# Patient Record
Sex: MALE | Race: WHITE | Employment: FULL TIME | URBAN - METROPOLITAN AREA
[De-identification: names, ages, dates, MRNs, and addresses within clinical notes are randomized per-mention and may not be internally consistent; named-entity substitution may affect disease eponyms.]

---

## 2017-02-21 ENCOUNTER — APPOINTMENT (OUTPATIENT)
Dept: CT IMAGING | Age: 52
End: 2017-02-21
Attending: PHYSICIAN ASSISTANT
Payer: COMMERCIAL

## 2017-02-21 ENCOUNTER — HOSPITAL ENCOUNTER (EMERGENCY)
Age: 52
Discharge: HOME | End: 2017-02-21
Attending: EMERGENCY MEDICINE
Payer: COMMERCIAL

## 2017-02-21 VITALS
TEMPERATURE: 98.6 F | RESPIRATION RATE: 18 BRPM | SYSTOLIC BLOOD PRESSURE: 141 MMHG | WEIGHT: 195 LBS | DIASTOLIC BLOOD PRESSURE: 89 MMHG | BODY MASS INDEX: 27.92 KG/M2 | OXYGEN SATURATION: 97 % | HEART RATE: 90 BPM | HEIGHT: 70 IN

## 2017-02-21 DIAGNOSIS — K52.9 ENTEROCOLITIS: Primary | ICD-10-CM

## 2017-02-21 LAB
APPEARANCE UR: CLEAR
BACTERIA URNS QL MICRO: ABNORMAL /HPF
BILIRUB UR QL: NEGATIVE
COLOR UR: YELLOW
EPITH CASTS URNS QL MICRO: ABNORMAL /LPF (ref 0–5)
GLUCOSE UR STRIP.AUTO-MCNC: NEGATIVE MG/DL
HGB UR QL STRIP: ABNORMAL
KETONES UR QL STRIP.AUTO: 80 MG/DL
LEUKOCYTE ESTERASE UR QL STRIP.AUTO: NEGATIVE
LIPASE SERPL-CCNC: 140 U/L (ref 73–393)
NITRITE UR QL STRIP.AUTO: NEGATIVE
PH UR STRIP: 5.5 [PH] (ref 5–8)
PROT UR STRIP-MCNC: 30 MG/DL
RBC #/AREA URNS HPF: NEGATIVE /HPF (ref 0–5)
SP GR UR REFRACTOMETRY: 1.02 (ref 1–1.03)
UROBILINOGEN UR QL STRIP.AUTO: 0.2 EU/DL (ref 0.2–1)
WBC URNS QL MICRO: ABNORMAL /HPF (ref 0–5)

## 2017-02-21 PROCEDURE — 74177 CT ABD & PELVIS W/CONTRAST: CPT

## 2017-02-21 PROCEDURE — 99284 EMERGENCY DEPT VISIT MOD MDM: CPT

## 2017-02-21 PROCEDURE — 74011636320 HC RX REV CODE- 636/320: Performed by: EMERGENCY MEDICINE

## 2017-02-21 PROCEDURE — 74011250636 HC RX REV CODE- 250/636: Performed by: PHYSICIAN ASSISTANT

## 2017-02-21 PROCEDURE — 83690 ASSAY OF LIPASE: CPT | Performed by: PHYSICIAN ASSISTANT

## 2017-02-21 PROCEDURE — 96374 THER/PROPH/DIAG INJ IV PUSH: CPT

## 2017-02-21 PROCEDURE — 96375 TX/PRO/DX INJ NEW DRUG ADDON: CPT

## 2017-02-21 PROCEDURE — 96361 HYDRATE IV INFUSION ADD-ON: CPT

## 2017-02-21 PROCEDURE — 81001 URINALYSIS AUTO W/SCOPE: CPT | Performed by: EMERGENCY MEDICINE

## 2017-02-21 PROCEDURE — 74011250637 HC RX REV CODE- 250/637: Performed by: PHYSICIAN ASSISTANT

## 2017-02-21 RX ORDER — CLONAZEPAM 1 MG/1
1 TABLET ORAL 2 TIMES DAILY
COMMUNITY

## 2017-02-21 RX ORDER — DIPHENHYDRAMINE HYDROCHLORIDE 50 MG/ML
25 INJECTION, SOLUTION INTRAMUSCULAR; INTRAVENOUS
Status: DISCONTINUED | OUTPATIENT
Start: 2017-02-21 | End: 2017-02-21

## 2017-02-21 RX ORDER — CIPROFLOXACIN 500 MG/1
500 TABLET ORAL 2 TIMES DAILY
Qty: 20 TAB | Refills: 0 | Status: SHIPPED | OUTPATIENT
Start: 2017-02-21 | End: 2017-03-03

## 2017-02-21 RX ORDER — OMEPRAZOLE 10 MG/1
10 CAPSULE, DELAYED RELEASE ORAL DAILY
COMMUNITY

## 2017-02-21 RX ORDER — DIPHENHYDRAMINE HCL 25 MG
25 CAPSULE ORAL
Status: COMPLETED | OUTPATIENT
Start: 2017-02-21 | End: 2017-02-21

## 2017-02-21 RX ORDER — ONDANSETRON 2 MG/ML
4 INJECTION INTRAMUSCULAR; INTRAVENOUS
Status: COMPLETED | OUTPATIENT
Start: 2017-02-21 | End: 2017-02-21

## 2017-02-21 RX ORDER — DICYCLOMINE HYDROCHLORIDE 10 MG/1
10 CAPSULE ORAL 4 TIMES DAILY
Qty: 20 CAP | Refills: 0 | Status: SHIPPED | OUTPATIENT
Start: 2017-02-21 | End: 2017-02-26

## 2017-02-21 RX ORDER — HYDROMORPHONE HYDROCHLORIDE 1 MG/ML
1 INJECTION, SOLUTION INTRAMUSCULAR; INTRAVENOUS; SUBCUTANEOUS
Status: COMPLETED | OUTPATIENT
Start: 2017-02-21 | End: 2017-02-21

## 2017-02-21 RX ORDER — ONDANSETRON 4 MG/1
4 TABLET, ORALLY DISINTEGRATING ORAL
Qty: 12 TAB | Refills: 0 | Status: SHIPPED | OUTPATIENT
Start: 2017-02-21

## 2017-02-21 RX ADMIN — SODIUM CHLORIDE 1000 ML: 900 INJECTION, SOLUTION INTRAVENOUS at 15:18

## 2017-02-21 RX ADMIN — HYDROMORPHONE HYDROCHLORIDE 1 MG: 1 INJECTION, SOLUTION INTRAMUSCULAR; INTRAVENOUS; SUBCUTANEOUS at 15:16

## 2017-02-21 RX ADMIN — IOPAMIDOL 100 ML: 612 INJECTION, SOLUTION INTRAVENOUS at 15:23

## 2017-02-21 RX ADMIN — DIPHENHYDRAMINE HYDROCHLORIDE 25 MG: 25 CAPSULE ORAL at 16:59

## 2017-02-21 RX ADMIN — ONDANSETRON 4 MG: 2 INJECTION INTRAMUSCULAR; INTRAVENOUS at 15:16

## 2017-02-21 NOTE — ED NOTES
I have reviewed discharge instructions with the patient. The patient verbalized understanding.   Ride here to drive home;

## 2017-02-21 NOTE — ED PROVIDER NOTES
[address]  EMERGENCY DEPARTMENT HISTORY AND PHYSICAL EXAM       Date: 2/21/2017   Patient Name: Ross Thornton   YOB: 1965  Medical Record Number: [id number]    History of Presenting Illness     Chief Complaint   Patient presents with    Abdominal Pain    Back Pain        History Provided By:  patient    Additional History:   2:41 PM   Ross Thornton is a 49 y.o. male who presents to the emergency department C/O severe back pain which radiates to the LLQ starting a \"couple\" days ago. The pain is positional and is alleviated when he lays down. The pain does not radiate to the groin. Associated sxs include nausea, vomiting, and diarrhea for 2 days. Pt was seen at [facility] this morning where labs showed an elevated white count. He was sent to the ED for further evaluation. Hx of diverticulitis. Denies abdominal shx. Denies medical allergies; he has tolerated Morphine and Dilaudid in the past. Denies fever, hematochezia, dysuria, urinary frequency, and any other sxs or complaints. Primary Care Provider: Nayeli Crystal MD   Specialist:    Past History     Past Medical History:   Past Medical History   Diagnosis Date    Anxiety     Diverticulitis     GERD (gastroesophageal reflux disease)         Past Surgical History:   History reviewed. No pertinent past surgical history. Family History:   History reviewed. No pertinent family history. Social History:   Social History   Substance Use Topics    Smoking status: Current Every Day Smoker     Packs/day: 1.00    Smokeless tobacco: None    Alcohol use Yes        Allergies:   No Known Allergies     Review of Systems   Review of Systems   Constitutional: Negative for fever. HENT: Positive for congestion. Gastrointestinal: Positive for abdominal pain (LLQ), diarrhea, nausea and vomiting. Negative for blood in stool. Genitourinary: Negative for dysuria and frequency.    Musculoskeletal: Positive for back pain.   All other systems reviewed and are negative. Physical Exam  Vitals:    02/21/17 1259 02/21/17 1638   BP: 163/85 141/89   Pulse: (!) 113 90   Resp: 20 18   Temp: 98.6 °F (37 °C)    SpO2: 100% 97%   Weight: 88.5 kg (195 lb)    Height: 5' 10\" (1.778 m)        Physical Exam   Nursing note and vitals reviewed. Vital signs and nursing notes reviewed. CONSTITUTIONAL: Alert. Well-appearing; well-nourished; in no apparent distress. HEAD: Normocephalic; atraumatic. EYES: PERRL; Conjunctiva clear. ENT: Moist mucus membranes. NECK: Supple; FROM without difficulty, non-tender; no cervical lymphadenopathy. CV: Normal S1, S2; no murmurs, rubs, or gallops. No chest wall tenderness. RESPIRATORY: Normal chest excursion with respiration; breath sounds clear and equal bilaterally; no wheezes, rhonchi, or rales. GI: Normal bowel sounds; non-distended; generalized mild tenderness, worst in LLQ; no guarding or rigidity; no palpable organomegaly. No CVA tenderness. BACK:  No evidence of trauma or deformity. Non-tender to palpation. EXT: Normal ROM in all four extremities; non-tender to palpation. SKIN: Normal for age and race; warm; dry; good turgor; no apparent lesions or exudate. NEURO: A & O x3. PSYCH:  Mood and affect appropriate.        Diagnostic Study Results     Labs -      Recent Results (from the past 12 hour(s))   URINALYSIS W/ RFLX MICROSCOPIC    Collection Time: 02/21/17  2:20 PM   Result Value Ref Range    Color YELLOW      Appearance CLEAR      Specific gravity 1.019 1.005 - 1.030      pH (UA) 5.5 5.0 - 8.0      Protein 30 (A) NEG mg/dL    Glucose NEGATIVE  NEG mg/dL    Ketone 80 (A) NEG mg/dL    Bilirubin NEGATIVE  NEG      Blood TRACE (A) NEG      Urobilinogen 0.2 0.2 - 1.0 EU/dL    Nitrites NEGATIVE  NEG      Leukocyte Esterase NEGATIVE  NEG     URINE MICROSCOPIC ONLY    Collection Time: 02/21/17  2:20 PM   Result Value Ref Range    WBC 0 to 1 0 - 5 /hpf    RBC NEGATIVE  0 - 5 /hpf    Epithelial cells FEW 0 - 5 /lpf    Bacteria FEW (A) NEG /hpf   LIPASE    Collection Time: 02/21/17  2:50 PM   Result Value Ref Range    Lipase 140 73 - 393 U/L       Radiologic Studies -  CT ABD PELV W CONT   Final Result   IMPRESSION:        1. Mild mural thickening of the colon with normal-appearing segments at the  splenic flexure and proximal transverse colon. Mural thickening of the distal  small bowel with focal adjacent mesenteric haziness in the right lower quadrant. Findings suggestive of nonspecific enterocolitis, inflammatory or infectious and  less likely ischemic. No evidence for bowel obstruction, abscess, or  perforation.     2. Indeterminant, too small to characterize 6 mm cortical hypodensity right  Kidney. As read by the radiologist.       Medical Decision Making   I am the first provider for this patient. I reviewed the vital signs, available nursing notes, past medical history, past surgical history, family history and social history. Vital Signs-Reviewed the patient's vital signs. Patient Vitals for the past 12 hrs:   Temp Pulse Resp BP SpO2   02/21/17 1638 - 90 18 141/89 97 %   02/21/17 1259 98.6 °F (37 °C) (!) 113 20 163/85 100 %       Pulse Oximetry Analysis - Normal 100% on room air     Old Medical Records: Nursing notes. Old medical records.   Medical records from [facility] earlier today:   - WBC: 13.6  - Hgb: 17.5   - UA: color: yellow, appearance: clear  - BUN: 10  - Creatinine: 0.9    Medications Given in the ED:  Medications   sodium chloride 0.9 % bolus infusion 1,000 mL (0 mL IntraVENous IV Completed 2/21/17 1637)   ondansetron (ZOFRAN) injection 4 mg (4 mg IntraVENous Given 2/21/17 1516)   HYDROmorphone (PF) (DILAUDID) injection 1 mg (1 mg IntraVENous Given 2/21/17 1516)   iopamidol (ISOVUE 300) 61 % contrast injection 100 mL (100 mL IntraVENous Given 2/21/17 1523)   diphenhydrAMINE (BENADRYL) capsule 25 mg (25 mg Oral Given 2/21/17 1659)        PROGRESS NOTE:  2:41 PM   Initial assessment performed. PROGRESS NOTE:   4:13 PM  Pt has been re-examined by Erika Jama PA-C. Pt is having some itching to nose. Will give Benadryl and reassess. His pain is significantly improved. Awaiting CT result. Written by Karley Cheung ED Scribe, as dictated by Erika Jama PA-C. Discharge Note:  4:52 PM   Pt has been reexamined. Patient has no new complaints, changes, or physical findings. Care plan outlined and precautions discussed. Results were reviewed with the patient. All medications were reviewed with the patient; will d/c home with Zofran, Cipro and bentyl. All of pt's questions and concerns were addressed. Patient was instructed and agrees to follow up with [facility] RAMAN Neff MD (Gastroenterologist), as well as to return to the ED upon further deterioration. Patient is ready to go home. Diagnosis   Clinical Impression:   1. Enterocolitis         Follow-up Information     Follow up With Details Comments Ivan [address] MD Priscilla Schedule an appointment as soon as possible for a visit in 2 days For GI follow up [address] [place] [address] [id number] [facility] EMERGENCY DEPT  As needed, If symptoms worsen [address] [place] [id number]  [phone]          Discharge Medication List as of 2/21/2017  4:55 PM      START taking these medications    Details   ciprofloxacin HCl (CIPRO) 500 mg tablet Take 1 Tab by mouth two (2) times a day for 10 days. , Normal, Disp-20 Tab, R-0      ondansetron (ZOFRAN ODT) 4 mg disintegrating tablet Take 1 Tab by mouth every eight (8) hours as needed for Nausea., Normal, Disp-12 Tab, R-0      dicyclomine (BENTYL) 10 mg capsule Take 1 Cap by mouth four (4) times daily for 5 days. , Normal, Disp-20 Cap, R-0         CONTINUE these medications which have NOT CHANGED    Details   omeprazole (PRILOSEC) 10 mg capsule Take 10 mg by mouth daily. , Historical Med      clonazePAM (KLONOPIN) 1 mg tablet Take 1 mg by mouth two (2) times a day., Historical Med             _______________________________   Attestations: This note is prepared by Sharri Sheppard, acting as a Scribe for Jun Chappell PA-C on 2:41 PM on 2/21/2017 . Jun Chappell PA-C: The scribe's documentation has been prepared under my direction and personally reviewed by me in its entirety.   _______________________________

## 2017-02-21 NOTE — LETTER
[facility] [place] 
[facility] EMERGENCY DEPT 
[address] [address] 
[phone] Work/School Note Date: 2/21/2017 To Whom It May concern: 
 
Ross Thornton was seen and treated today in the emergency room by the following provider(s): 
Attending Provider: Mansoor Hernandez MD 
Physician Assistant: Radha Garcia. Ross Thornton may return to work on Saturday, February 25, 2017.  
 
Sincerely, 
 
 
 
 
RENA Garcia

## 2017-02-21 NOTE — DISCHARGE INSTRUCTIONS
Colitis: Care Instructions  Your Care Instructions  Colitis is the medical term for swelling (inflammation) of the intestine. It can be caused by different things, such as an infection or loss of blood flow in the intestine. Other causes are problems like Crohn's disease or ulcerative colitis. Symptoms may include fever, diarrhea that may be bloody, or belly pain. Sometimes symptoms go away without treatment. But you may need treatment or more tests, such as blood tests or a stool test. Or you may need imaging tests like a CT scan or a colonoscopy. In some cases, the doctor may want to test a sample of tissue from the intestine. This test is called a biopsy. The doctor has checked you carefully, but problems can develop later. If you notice any problems or new symptoms, get medical treatment right away. Follow-up care is a key part of your treatment and safety. Be sure to make and go to all appointments, and call your doctor if you are having problems. It's also a good idea to know your test results and keep a list of the medicines you take. How can you care for yourself at home? · Rest until you feel better. · Your doctor may recommend that you eat bland foods. These include rice, dry toast or crackers, bananas, and applesauce. · To prevent dehydration, drink plenty of fluids. Choose water and other caffeine-free clear liquids until you feel better. If you have kidney, heart, or liver disease and have to limit fluids, talk with your doctor before you increase the amount of fluids you drink. · Be safe with medicines. Take your medicines exactly as prescribed. Call your doctor if you think you are having a problem with your medicine. You will get more details on the specific medicines your doctor prescribes. When should you call for help? Call 911 anytime you think you may need emergency care. For example, call if:  · You passed out (lost consciousness).   · You vomit blood or what looks like coffee grounds. · Your stools are maroon or very bloody. Call your doctor now or seek immediate medical care if:  · You have new or worse pain. · You have a new or higher fever. · You have new or worse symptoms. · You cannot keep fluids or medicines down. Watch closely for changes in your health, and be sure to contact your doctor if:  · You do not get better as expected. Where can you learn more? Go to http://polo-ayana.info/. Luna Lunsford in the search box to learn more about \"Colitis: Care Instructions. \"  Current as of: August 9, 2016  Content Version: 11.1  © 0103-7666 [employer]. Care instructions adapted under license by [employer] (which disclaims liability or warranty for this information). If you have questions about a medical condition or this instruction, always ask your healthcare professional. [address] any warranty or liability for your use of this information.